# Patient Record
Sex: MALE | Race: WHITE | NOT HISPANIC OR LATINO
[De-identification: names, ages, dates, MRNs, and addresses within clinical notes are randomized per-mention and may not be internally consistent; named-entity substitution may affect disease eponyms.]

---

## 2020-01-09 ENCOUNTER — APPOINTMENT (OUTPATIENT)
Dept: VASCULAR SURGERY | Facility: CLINIC | Age: 65
End: 2020-01-09
Payer: COMMERCIAL

## 2020-01-09 VITALS — HEIGHT: 67 IN | BODY MASS INDEX: 36.88 KG/M2 | WEIGHT: 235 LBS

## 2020-01-09 DIAGNOSIS — Z78.9 OTHER SPECIFIED HEALTH STATUS: ICD-10-CM

## 2020-01-09 DIAGNOSIS — Z80.0 FAMILY HISTORY OF MALIGNANT NEOPLASM OF DIGESTIVE ORGANS: ICD-10-CM

## 2020-01-09 PROCEDURE — 99242 OFF/OP CONSLTJ NEW/EST SF 20: CPT

## 2020-01-09 NOTE — PHYSICAL EXAM
[Normal Breath Sounds] : Normal breath sounds [2+] : right 2+ [Varicose Veins Of The Left Leg] : of the left leg [Varicose Veins Of Lower Extremities] : present [No Rash or Lesion] : No rash or lesion [Ankle Swelling On The Right] : mild [Oriented to Person] : oriented to person [Alert] : alert [Oriented to Place] : oriented to place [Calm] : calm [de-identified] : NAD [de-identified] : supple [de-identified] : NCAT [FreeTextEntry1] : left posterior thigh / pop fossa with small focal varicose vein, soft and compressible; legs with scattered telangiectasias [de-identified] : soft, NT, ND

## 2020-01-09 NOTE — ASSESSMENT
[FreeTextEntry1] : 66 y/o M w/ left leg asymptomatic varicose veins\par Discussed with him the natural hx of venous disease, as well as symptoms that venous disease causes. He has no such symptoms currently. We discussed that he may benefit from compression therapy, but this is not a strict recommendation as he does not have edema. No further workup needed currently.\par He will f/u with me if he develops any symptoms.

## 2020-01-09 NOTE — CONSULT LETTER
[Dear  ___] : Dear  [unfilled], [Consult Letter:] : I had the pleasure of evaluating your patient, [unfilled]. [Please see my note below.] : Please see my note below. [( Thank you for referring [unfilled] for consultation for _____ )] : Thank you for referring [unfilled] for consultation for [unfilled] [Consult Closing:] : Thank you very much for allowing me to participate in the care of this patient.  If you have any questions, please do not hesitate to contact me. [Sincerely,] : Sincerely, [FreeTextEntry3] : Ethan Daly MD, RPVI

## 2020-01-09 NOTE — HISTORY OF PRESENT ILLNESS
[FreeTextEntry1] : 66 y/o M referred by Dr. Sanabria for evaluation of left leg varicose vein.\par He noticed a focal varicose vein on the back of his left thigh going to his knee several months ago, he does not know how long he has had it. He denies pain, discomfort, burning, itching or any other symptom associated with the vein. He denies leg swelling, heaviness, achiness.\par Ambulates well without difficulty.\par \par No past hx of DVT or PE.\par Former smoker, quit 40 yrs ago.\par \par No past medical hx\par s/p carpal tunnel release\par s/p left knee arthoscopic surgery

## 2020-07-31 ENCOUNTER — APPOINTMENT (OUTPATIENT)
Dept: CARDIOLOGY | Facility: CLINIC | Age: 65
End: 2020-07-31
Payer: COMMERCIAL

## 2020-07-31 VITALS
DIASTOLIC BLOOD PRESSURE: 80 MMHG | SYSTOLIC BLOOD PRESSURE: 124 MMHG | BODY MASS INDEX: 37.67 KG/M2 | WEIGHT: 240 LBS | HEIGHT: 67 IN | HEART RATE: 75 BPM

## 2020-07-31 DIAGNOSIS — Z82.49 FAMILY HISTORY OF ISCHEMIC HEART DISEASE AND OTHER DISEASES OF THE CIRCULATORY SYSTEM: ICD-10-CM

## 2020-07-31 PROCEDURE — 99204 OFFICE O/P NEW MOD 45 MIN: CPT | Mod: 25

## 2020-07-31 PROCEDURE — 93015 CV STRESS TEST SUPVJ I&R: CPT

## 2020-07-31 NOTE — PHYSICAL EXAM
[Normal Conjunctiva] : the conjunctiva exhibited no abnormalities [FreeTextEntry1] : obese male. [Normal Oral Mucosa] : normal oral mucosa [Eyelids - No Xanthelasma] : the eyelids demonstrated no xanthelasmas [No Oral Pallor] : no oral pallor [Normal Jugular Venous A Waves Present] : normal jugular venous A waves present [No Oral Cyanosis] : no oral cyanosis [Normal Jugular Venous V Waves Present] : normal jugular venous V waves present [No Jugular Venous Gannon A Waves] : no jugular venous gannon A waves [Respiration, Rhythm And Depth] : normal respiratory rhythm and effort [Exaggerated Use Of Accessory Muscles For Inspiration] : no accessory muscle use [Auscultation Breath Sounds / Voice Sounds] : lungs were clear to auscultation bilaterally [Heart Sounds] : normal S1 and S2 [Heart Rate And Rhythm] : heart rate and rhythm were normal [Abdomen Soft] : soft [Murmurs] : no murmurs present [Abdomen Tenderness] : non-tender [Abnormal Walk] : normal gait [Abdomen Mass (___ Cm)] : no abdominal mass palpated [Gait - Sufficient For Exercise Testing] : the gait was sufficient for exercise testing [Nail Clubbing] : no clubbing of the fingernails [Cyanosis, Localized] : no localized cyanosis [Petechial Hemorrhages (___cm)] : no petechial hemorrhages [Skin Color & Pigmentation] : normal skin color and pigmentation [Skin Lesions] : no skin lesions [] : no rash [No Venous Stasis] : no venous stasis [No Skin Ulcers] : no skin ulcer [No Xanthoma] : no  xanthoma was observed [Affect] : the affect was normal [Mood] : the mood was normal [Oriented To Time, Place, And Person] : oriented to person, place, and time [No Anxiety] : not feeling anxious

## 2020-07-31 NOTE — PHYSICAL EXAM
[FreeTextEntry1] : obese male. [Normal Conjunctiva] : the conjunctiva exhibited no abnormalities [Normal Oral Mucosa] : normal oral mucosa [Eyelids - No Xanthelasma] : the eyelids demonstrated no xanthelasmas [No Oral Pallor] : no oral pallor [Normal Jugular Venous A Waves Present] : normal jugular venous A waves present [No Oral Cyanosis] : no oral cyanosis [Normal Jugular Venous V Waves Present] : normal jugular venous V waves present [No Jugular Venous Gannon A Waves] : no jugular venous gannon A waves [Exaggerated Use Of Accessory Muscles For Inspiration] : no accessory muscle use [Respiration, Rhythm And Depth] : normal respiratory rhythm and effort [Heart Sounds] : normal S1 and S2 [Heart Rate And Rhythm] : heart rate and rhythm were normal [Auscultation Breath Sounds / Voice Sounds] : lungs were clear to auscultation bilaterally [Murmurs] : no murmurs present [Abdomen Soft] : soft [Abdomen Tenderness] : non-tender [Abnormal Walk] : normal gait [Abdomen Mass (___ Cm)] : no abdominal mass palpated [Cyanosis, Localized] : no localized cyanosis [Gait - Sufficient For Exercise Testing] : the gait was sufficient for exercise testing [Nail Clubbing] : no clubbing of the fingernails [Skin Color & Pigmentation] : normal skin color and pigmentation [Petechial Hemorrhages (___cm)] : no petechial hemorrhages [No Venous Stasis] : no venous stasis [] : no rash [Skin Lesions] : no skin lesions [No Skin Ulcers] : no skin ulcer [No Xanthoma] : no  xanthoma was observed [Affect] : the affect was normal [Mood] : the mood was normal [Oriented To Time, Place, And Person] : oriented to person, place, and time [No Anxiety] : not feeling anxious

## 2020-08-03 NOTE — REASON FOR VISIT
[Initial Evaluation] : an initial evaluation of [FreeTextEntry1] : The patient describes no acute cardiac symptoms. However he comes for cardiology evaluation and a preventive mode. The patient has had no symptoms of chest pain or unusual shortness of breath. He did experience dizziness recently when climbing up and down stairs while wearing a mask. No history of a heart condition or heart diagnosis. He wants consulted a vascular surgeon because of varicose veins.

## 2020-08-03 NOTE — HISTORY OF PRESENT ILLNESS
[FreeTextEntry1] : Patient has never spent the night in the hospital. Is no history of hypertension or diabetes mellitus. There is no history of asthma or wheezing. As you know is a nonsmoker;.his cholesterol has been "good"

## 2020-08-03 NOTE — DISCUSSION/SUMMARY
[FreeTextEntry1] : The patient comes for a cardiology evaluation. The patient's main clinical issue in my opinion is obesity. Other risk factors appear to be relatively well controlled. There is no history of diabetes hyperlipidemia or cigarette smoking. Today's treadmill stress test revealed no evidence of exercise-induced myocardial ischemia however exercise capacity is significantly limited. I urged the patient to maintain a program of regular walking exercise and weight reduction. We discussed the importance of a low call the high-grade  at some length. Followup will be in 6 months. I have asked the patient to contact me if there are any untoward symptoms.

## 2021-04-06 ENCOUNTER — APPOINTMENT (OUTPATIENT)
Dept: CARDIOLOGY | Facility: CLINIC | Age: 66
End: 2021-04-06
Payer: COMMERCIAL

## 2021-04-06 ENCOUNTER — NON-APPOINTMENT (OUTPATIENT)
Age: 66
End: 2021-04-06

## 2021-04-06 VITALS
SYSTOLIC BLOOD PRESSURE: 115 MMHG | WEIGHT: 246 LBS | DIASTOLIC BLOOD PRESSURE: 84 MMHG | HEIGHT: 67 IN | BODY MASS INDEX: 38.61 KG/M2 | HEART RATE: 69 BPM

## 2021-04-06 DIAGNOSIS — Z00.00 ENCOUNTER FOR GENERAL ADULT MEDICAL EXAMINATION W/OUT ABNORMAL FINDINGS: ICD-10-CM

## 2021-04-06 PROCEDURE — 93000 ELECTROCARDIOGRAM COMPLETE: CPT

## 2021-04-06 PROCEDURE — 99072 ADDL SUPL MATRL&STAF TM PHE: CPT

## 2021-04-06 PROCEDURE — 99213 OFFICE O/P EST LOW 20 MIN: CPT

## 2021-04-06 RX ORDER — MINOCYCLINE HYDROCHLORIDE 1 MG/1
1 POWDER ORAL
Qty: 33 | Refills: 0 | Status: DISCONTINUED | COMMUNITY
Start: 2021-03-15 | End: 2021-04-06

## 2021-04-06 RX ORDER — DOXYCYCLINE HYCLATE 50 MG/1
50 CAPSULE ORAL
Qty: 20 | Refills: 0 | Status: DISCONTINUED | COMMUNITY
Start: 2020-12-30 | End: 2021-04-06

## 2021-04-06 NOTE — REASON FOR VISIT
[FreeTextEntry1] : The patient returns for followup today. His main clinical issues and his extreme overweight. The patient has a long history of overweight. He was heavy as a child and this has continued into his adulthood. There is a family history of obesity. In spite of this the patient has enjoyed excellent health. Patient has had no cardiac symptoms. There were no symptoms of chest pain shortness of breath or palpitations. Exercise and activity are somewhat limited. He does have ongoing left knee pain and underwent meniscal surgery on that knee previously. The pt. has no history of hypertension diabetes or hyperlipidemia.

## 2021-04-06 NOTE — DISCUSSION/SUMMARY
[FreeTextEntry1] : The patient's only clinical finding of severe obesity his height is 5 foot 7 and his weight today was 246. Other than this problem the patient has enjoyed excellent health. I believe there is a grade opportunity here to improve his long-term health prospects body weight reduction this will involve modifying his diet which has been very high in carbohydrates and trying to improve his exercise. I suggested swimming as a good exercise for him. The patient's cardiogram today was normal as was her previous one done elsewhere. He had an echocardiogram done by his primary physician which showed is recorded as essentially normal. Except for some left atrial enlargement. Plan to do another echo in 6 months time.

## 2021-04-06 NOTE — PHYSICAL EXAM
[FreeTextEntry1] : severely overweight. [Normal Conjunctiva] : the conjunctiva exhibited no abnormalities [Eyelids - No Xanthelasma] : the eyelids demonstrated no xanthelasmas [Normal Oral Mucosa] : normal oral mucosa [No Oral Pallor] : no oral pallor [No Oral Cyanosis] : no oral cyanosis [Normal Jugular Venous A Waves Present] : normal jugular venous A waves present [Normal Jugular Venous V Waves Present] : normal jugular venous V waves present [No Jugular Venous Gannon A Waves] : no jugular venous gannon A waves [Respiration, Rhythm And Depth] : normal respiratory rhythm and effort [Exaggerated Use Of Accessory Muscles For Inspiration] : no accessory muscle use [Auscultation Breath Sounds / Voice Sounds] : lungs were clear to auscultation bilaterally [Heart Rate And Rhythm] : heart rate and rhythm were normal [Heart Sounds] : normal S1 and S2 [Murmurs] : no murmurs present [Abdomen Soft] : soft [Abdomen Tenderness] : non-tender [Abdomen Mass (___ Cm)] : no abdominal mass palpated [Abnormal Walk] : normal gait [Gait - Sufficient For Exercise Testing] : the gait was sufficient for exercise testing [Nail Clubbing] : no clubbing of the fingernails [Cyanosis, Localized] : no localized cyanosis [Petechial Hemorrhages (___cm)] : no petechial hemorrhages [Skin Color & Pigmentation] : normal skin color and pigmentation [] : no rash [No Venous Stasis] : no venous stasis [Skin Lesions] : no skin lesions [No Skin Ulcers] : no skin ulcer [No Xanthoma] : no  xanthoma was observed [Oriented To Time, Place, And Person] : oriented to person, place, and time [Affect] : the affect was normal [Mood] : the mood was normal [No Anxiety] : not feeling anxious

## 2021-06-03 ENCOUNTER — APPOINTMENT (OUTPATIENT)
Dept: VASCULAR SURGERY | Facility: CLINIC | Age: 66
End: 2021-06-03
Payer: COMMERCIAL

## 2021-06-03 VITALS
SYSTOLIC BLOOD PRESSURE: 143 MMHG | DIASTOLIC BLOOD PRESSURE: 94 MMHG | BODY MASS INDEX: 38.61 KG/M2 | HEART RATE: 68 BPM | RESPIRATION RATE: 16 BRPM | OXYGEN SATURATION: 98 % | HEIGHT: 67 IN | WEIGHT: 246 LBS

## 2021-06-03 PROCEDURE — 99213 OFFICE O/P EST LOW 20 MIN: CPT

## 2021-06-03 PROCEDURE — 99072 ADDL SUPL MATRL&STAF TM PHE: CPT

## 2021-06-03 PROCEDURE — 93971 EXTREMITY STUDY: CPT

## 2021-06-13 NOTE — PHYSICAL EXAM
[Normal Breath Sounds] : Normal breath sounds [2+] : left 2+ [Varicose Veins Of Lower Extremities] : present [Varicose Veins Of The Left Leg] : of the left leg [Ankle Swelling On The Right] : mild [No Rash or Lesion] : No rash or lesion [Alert] : alert [Oriented to Person] : oriented to person [Oriented to Place] : oriented to place [Calm] : calm [de-identified] : NAD [de-identified] : NCAT [de-identified] : supple [FreeTextEntry1] : left posterior thigh / pop fossa with small focal varicose vein, soft and compressible; legs with scattered telangiectasias [de-identified] : soft, NT, ND

## 2021-06-13 NOTE — HISTORY OF PRESENT ILLNESS
[FreeTextEntry1] : 66 y/o M referred by Dr. Sanabria for evaluation of left leg varicose vein.\par He noticed a focal varicose vein on the back of his left thigh going to his knee several months ago, he does not know how long he has had it. He denies pain, discomfort, burning, itching or any other symptom associated with the vein. He denies leg swelling, heaviness, achiness.\par Ambulates well without difficulty.\par \par No past hx of DVT or PE.\par Former smoker, quit 40 yrs ago.\par \par No past medical hx\par s/p carpal tunnel release\par s/p left knee arthoscopic surgery [de-identified] : 6/3/21 - He returns for follow-up as he recently noted enlargement of his left thigh varicose vein.  He continues to deny pain, discomfort, itching, heaviness or aching over the leg.

## 2021-09-13 ENCOUNTER — NON-APPOINTMENT (OUTPATIENT)
Age: 66
End: 2021-09-13

## 2021-09-13 ENCOUNTER — APPOINTMENT (OUTPATIENT)
Dept: CARDIOLOGY | Facility: CLINIC | Age: 66
End: 2021-09-13
Payer: COMMERCIAL

## 2021-09-13 VITALS
WEIGHT: 239 LBS | HEIGHT: 67 IN | HEART RATE: 58 BPM | BODY MASS INDEX: 37.51 KG/M2 | DIASTOLIC BLOOD PRESSURE: 80 MMHG | SYSTOLIC BLOOD PRESSURE: 140 MMHG | TEMPERATURE: 97.8 F

## 2021-09-13 PROCEDURE — 93000 ELECTROCARDIOGRAM COMPLETE: CPT

## 2021-09-13 PROCEDURE — 99213 OFFICE O/P EST LOW 20 MIN: CPT

## 2021-09-13 NOTE — DISCUSSION/SUMMARY
[FreeTextEntry1] : The patient's clinical condition is currently stable. He underwent angiography at [facility] when he presented there with chest discomfort and elevated troponin the study revealed nonobstructive coronary disease including a 30% narrowing of the mid circumflex the other vessels revealed only mild luminal irregularities the left main was normal the ejection fraction was 55%\par \par The patient was sent home on aspirin 81 mg and atorvastatin 20 mg.\par \par I'm electing to add losartan 25 mg daily as a cardioprotective and slightly hypotensive agent.\par the patient is advised to continue a program of moderate exercise and weight reduction complaining to do a repeat treadmill test in the next few weeks.

## 2021-09-13 NOTE — REASON FOR VISIT
[FreeTextEntry1] : Patient was treated at [facility] on September 7 with symptoms of chest discomfort across the upper part of his chest lasting about half an hour. Those symptoms have completely resolved and the patient has returned to work and now feels back to normal with no recurrence of chest discomfort.\par In spite of the finding of only nonobstructive disease on angiography, I believe this may have represented a coronary event and planning to provide the patient with a maximum preventive strategy.\par My findings and recommendations were discussed with the patient and his wife.\par today's electrocardiogram reveals normal sinus rhythm and no acute changes are noted.

## 2021-10-19 ENCOUNTER — APPOINTMENT (OUTPATIENT)
Dept: CARDIOLOGY | Facility: CLINIC | Age: 66
End: 2021-10-19
Payer: COMMERCIAL

## 2021-10-19 ENCOUNTER — APPOINTMENT (OUTPATIENT)
Dept: CARDIOLOGY | Facility: CLINIC | Age: 66
End: 2021-10-19

## 2021-10-19 VITALS
DIASTOLIC BLOOD PRESSURE: 80 MMHG | HEIGHT: 67 IN | WEIGHT: 245 LBS | TEMPERATURE: 97 F | SYSTOLIC BLOOD PRESSURE: 120 MMHG | BODY MASS INDEX: 38.45 KG/M2

## 2021-10-19 PROCEDURE — 99213 OFFICE O/P EST LOW 20 MIN: CPT | Mod: 25

## 2021-10-19 PROCEDURE — 93015 CV STRESS TEST SUPVJ I&R: CPT

## 2021-10-19 NOTE — REASON FOR VISIT
[FreeTextEntry1] : The patient returns to the office today for followup including treadmill testing. He developed symptoms of chest discomfort and evidence of non-Q MI on 9 721 the patient was taken to the Cath Lab at [facility]. Study revealed nonobstructive coronary disease with the most significant narrowing being  30% and the left circumflex. This was followed by "a small aneurysm". Patient did not require coronary stenting. Following this incident his clinical condition has stabilized. 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 Patient's main risk factor is severe obesity. The patient has had difficulty losing weight. Ventricular function was normal at cardiac cath.

## 2021-10-19 NOTE — HISTORY OF PRESENT ILLNESS
[FreeTextEntry1] : Patient describes no symptoms of chest discomfort significant shortness of breath dizziness lightheadedness or palpitations.

## 2021-10-19 NOTE — DISCUSSION/SUMMARY
[FreeTextEntry1] : The patient's treadmill test is characterized by limited exercise capacity but no evidence of exercise-induced myocardial ischemia. Following the test I discussed the findings in detail with the patient and his wife and I also reviewed the recent cardiac angiogram. We must focused our attention on aggressive risk factor modification. I recommended that atorvastatin be increased from 20 mg to 40 mg daily. He will also remain on low dose aspirin and losartan. I also discussed in detail the importance of diet and exercise. We spoke about a low carbohydrate diet and a program of regular aerobic exercise which has been lacking. Weight reduction is necessary in this case. We will continue to follow the patient's progress periodically and I have asked the patient to call if there are any recurrent symptoms.

## 2022-03-24 ENCOUNTER — LABORATORY RESULT (OUTPATIENT)
Age: 67
End: 2022-03-24

## 2022-05-03 ENCOUNTER — APPOINTMENT (OUTPATIENT)
Dept: CARDIOLOGY | Facility: CLINIC | Age: 67
End: 2022-05-03

## 2022-05-03 ENCOUNTER — APPOINTMENT (OUTPATIENT)
Dept: CARDIOLOGY | Facility: CLINIC | Age: 67
End: 2022-05-03
Payer: COMMERCIAL

## 2022-05-03 ENCOUNTER — NON-APPOINTMENT (OUTPATIENT)
Age: 67
End: 2022-05-03

## 2022-05-03 VITALS
BODY MASS INDEX: 37.98 KG/M2 | DIASTOLIC BLOOD PRESSURE: 80 MMHG | WEIGHT: 242 LBS | OXYGEN SATURATION: 95 % | SYSTOLIC BLOOD PRESSURE: 130 MMHG | HEIGHT: 67 IN | HEART RATE: 81 BPM

## 2022-05-03 PROCEDURE — 93000 ELECTROCARDIOGRAM COMPLETE: CPT

## 2022-05-03 PROCEDURE — 99214 OFFICE O/P EST MOD 30 MIN: CPT

## 2022-05-03 NOTE — DISCUSSION/SUMMARY
[FreeTextEntry1] : The patient's clinical condition is stable.  There have been no recurrences of cardiac symptoms.  There have been no symptoms of chest pain shortness of breath or palpitations.  The patient does walking exercise at work but unfortunately weight reduction has been very challenging in this case.  The cardiorespiratory examination is normal the electrocardiograph reveals sinus rhythm with no acute changes noted.  The patient is on appropriate medications with aspirin 81 mg atorvastatin 40 mg losartan 25 mg twice daily.  He will be having blood work with his primary care physician shortly.  In order to facilitate weight reduction I would wonder if he might be a good candidate for semaglutide.  Urging the patient to try to increase his program of exercise to include some form of aerobic exercise on a daily basis.  I do like to do treadmill testing at the next visit in several months.\par The patient's recent lipid profile revealed extraordinarily low numbers.  I am electing to continue his statin as is because is tolerating it well without clinical symptoms or side effects.

## 2022-05-03 NOTE — REASON FOR VISIT
[FreeTextEntry1] : The patient comes for follow-up today.  He had a coronary syndrome in September 2021 and was taken to the Cath Lab at [facility] he was taken to the Cath Lab and this study revealed nonobstructive coronary artery disease with the most significant narrowing being in the left circumflex of 30%.  He has been treated with medications and there has never been a recurrence of those symptoms.  Symptom at that time was like a heaviness across his upper chest.  The patient is active at work as a  he does considerable walking and has had no difficulty in doing this.  Other exercise besides his work is limited.

## 2022-10-17 ENCOUNTER — NON-APPOINTMENT (OUTPATIENT)
Age: 67
End: 2022-10-17

## 2022-10-17 ENCOUNTER — APPOINTMENT (OUTPATIENT)
Dept: CARDIOLOGY | Facility: CLINIC | Age: 67
End: 2022-10-17

## 2022-10-17 VITALS
SYSTOLIC BLOOD PRESSURE: 118 MMHG | HEIGHT: 67 IN | DIASTOLIC BLOOD PRESSURE: 82 MMHG | TEMPERATURE: 97.6 F | RESPIRATION RATE: 18 BRPM | WEIGHT: 230.13 LBS | BODY MASS INDEX: 36.12 KG/M2 | HEART RATE: 66 BPM | OXYGEN SATURATION: 97 %

## 2022-10-17 PROCEDURE — 93000 ELECTROCARDIOGRAM COMPLETE: CPT

## 2022-10-17 PROCEDURE — 99214 OFFICE O/P EST MOD 30 MIN: CPT | Mod: 25

## 2022-10-17 NOTE — DISCUSSION/SUMMARY
[FreeTextEntry1] : The patient had a very effective response to Ozempic (semaglutide) which was given for weight reduction by his primary care physician.  Reason why this should not be continued until an optimal weight is achieved.  There have been no acute cardiac symptoms cardiorespiratory examination is normal blood pressure was 118/82 electrocardiogram normal.\par Benefits of the GLP-1 agonist class were discussed at great length with the patient and his wife.\par \par The patient's exercise has been somewhat limited and I am urging him to try to begin a program of daily exercise. \par \par Plan to do treadmill testing at the next visit.

## 2022-10-18 ENCOUNTER — NON-APPOINTMENT (OUTPATIENT)
Age: 67
End: 2022-10-18

## 2022-11-29 ENCOUNTER — APPOINTMENT (OUTPATIENT)
Dept: CARDIOLOGY | Facility: CLINIC | Age: 67
End: 2022-11-29

## 2023-01-27 ENCOUNTER — RX RENEWAL (OUTPATIENT)
Age: 68
End: 2023-01-27

## 2023-04-18 ENCOUNTER — APPOINTMENT (OUTPATIENT)
Dept: CARDIOLOGY | Facility: CLINIC | Age: 68
End: 2023-04-18
Payer: COMMERCIAL

## 2023-04-18 VITALS
DIASTOLIC BLOOD PRESSURE: 78 MMHG | BODY MASS INDEX: 36.76 KG/M2 | HEART RATE: 56 BPM | RESPIRATION RATE: 18 BRPM | WEIGHT: 234.19 LBS | TEMPERATURE: 97.2 F | HEIGHT: 67 IN | SYSTOLIC BLOOD PRESSURE: 134 MMHG | OXYGEN SATURATION: 97 %

## 2023-04-18 PROCEDURE — 99213 OFFICE O/P EST LOW 20 MIN: CPT | Mod: 25

## 2023-04-18 PROCEDURE — 93015 CV STRESS TEST SUPVJ I&R: CPT

## 2023-04-18 NOTE — DISCUSSION/SUMMARY
[FreeTextEntry1] : The patient returns today for follow-up including treadmill testing\par \par Fortunately there has been no recurrence of chest pain or other cardiac symptoms.  However the patient has had difficulty losing additional weight and has been sedentary.\par \par He had initially lost weight with Ozempic but this leveled off and Mounjaro was not additionally effective as well.\par \par The treadmill test today was characterized by limited exercise capacity but no evidence of exercise-induced myocardial ischemia\par \par The patient needs to get into a program of more consistent exercise and also a nutrition consult would be valuable and important.\par \par

## 2023-04-18 NOTE — REASON FOR VISIT
[FreeTextEntry1] : Patient returns today for follow-up and treadmill testing.  He was referred to cardiac catheterization in September 2021 at [facility] and was found to have nonobstructive coronary artery disease.  The patient has done well with medical treatment since that time and has been making a concerted effort to lose weight\par \par Patient denies any acute cardiac symptoms.  \par However we have not made significant progress with exercise or weight reduction.  \par \par

## 2023-04-18 NOTE — PHYSICAL EXAM

## 2024-02-12 RX ORDER — LOSARTAN POTASSIUM 25 MG/1
25 TABLET, FILM COATED ORAL
Qty: 180 | Refills: 3 | Status: ACTIVE | COMMUNITY
Start: 2021-09-13 | End: 1900-01-01

## 2024-02-12 RX ORDER — ASPIRIN ENTERIC COATED TABLETS 81 MG 81 MG/1
81 TABLET, DELAYED RELEASE ORAL DAILY
Qty: 90 | Refills: 3 | Status: ACTIVE | COMMUNITY
Start: 2021-04-06 | End: 1900-01-01

## 2024-02-13 ENCOUNTER — RX RENEWAL (OUTPATIENT)
Age: 69
End: 2024-02-13

## 2024-02-13 RX ORDER — ATORVASTATIN CALCIUM 40 MG/1
40 TABLET, FILM COATED ORAL
Qty: 90 | Refills: 3 | Status: ACTIVE | COMMUNITY
Start: 2021-09-08 | End: 1900-01-01

## 2024-04-23 ENCOUNTER — NON-APPOINTMENT (OUTPATIENT)
Age: 69
End: 2024-04-23

## 2024-04-23 ENCOUNTER — APPOINTMENT (OUTPATIENT)
Dept: CARDIOLOGY | Facility: CLINIC | Age: 69
End: 2024-04-23
Payer: COMMERCIAL

## 2024-04-23 ENCOUNTER — APPOINTMENT (OUTPATIENT)
Dept: HEART AND VASCULAR | Facility: CLINIC | Age: 69
End: 2024-04-23
Payer: COMMERCIAL

## 2024-04-23 VITALS
HEIGHT: 67 IN | DIASTOLIC BLOOD PRESSURE: 80 MMHG | SYSTOLIC BLOOD PRESSURE: 120 MMHG | HEART RATE: 75 BPM | BODY MASS INDEX: 38.45 KG/M2 | WEIGHT: 245 LBS | OXYGEN SATURATION: 98 %

## 2024-04-23 DIAGNOSIS — I10 ESSENTIAL (PRIMARY) HYPERTENSION: ICD-10-CM

## 2024-04-23 DIAGNOSIS — I25.10 ATHEROSCLEROTIC HEART DISEASE OF NATIVE CORONARY ARTERY W/OUT ANGINA PECTORIS: ICD-10-CM

## 2024-04-23 DIAGNOSIS — I83.90 ASYMPTOMATIC VARICOSE VEINS OF UNSPECIFIED LOWER EXTREMITY: ICD-10-CM

## 2024-04-23 DIAGNOSIS — E66.9 OBESITY, UNSPECIFIED: ICD-10-CM

## 2024-04-23 DIAGNOSIS — E66.01 MORBID (SEVERE) OBESITY DUE TO EXCESS CALORIES: ICD-10-CM

## 2024-04-23 PROCEDURE — 93000 ELECTROCARDIOGRAM COMPLETE: CPT

## 2024-04-23 PROCEDURE — 99214 OFFICE O/P EST MOD 30 MIN: CPT | Mod: 25

## 2024-04-23 NOTE — CARDIOLOGY SUMMARY
[de-identified] : Exercise Stress Test 4/18/23: 5:46 min:s, Max, Mets: 6.20. 72%, test stopped due to fatigue. Appropriate hr and bp response. No ST changes. No evidence of exercise induced ischemia to attained HR.

## 2024-04-23 NOTE — ASSESSMENT
[FreeTextEntry1] : 69M   Non Obstructive CAD HTN HLD  Obesity  Reviewed prior testing.   EKG NSR Treadmill Stress Aprol 2023 -no ischemia though suboptimal test, 72% MPHR, 6 METS Cath 2021: non obs cad, 30% LCx  - update ECHO - referral for sleep study - continue baby aspirin, lipitor 40mg - BP controlled - continue losartan 25mg - counseled on weight loss

## 2024-04-23 NOTE — HISTORY OF PRESENT ILLNESS
[FreeTextEntry1] : 69M patient of Dr. Craft here today to transition care.  He was referred to cardiac catheterization in September 2021 at [facility] and was found to have nonobstructive coronary artery disease. The patient has done well with medical treatment since that time and has been making a concerted effort to lose weight. He had initially lost weight with Ozempic but this leveled off and Mounjaro was not additionally effective as well.   No cp/sob/palpitations/edema/dizziness.   Does not monitor BP at home. 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     Non smoker Social etoh use Fhx: Father HF/CAD early 60s

## 2024-07-05 ENCOUNTER — APPOINTMENT (OUTPATIENT)
Dept: CARDIOLOGY | Facility: CLINIC | Age: 69
End: 2024-07-05
Payer: COMMERCIAL

## 2024-07-05 PROCEDURE — 93306 TTE W/DOPPLER COMPLETE: CPT

## 2024-09-25 ENCOUNTER — APPOINTMENT (OUTPATIENT)
Dept: CARDIOLOGY | Facility: CLINIC | Age: 69
End: 2024-09-25
Payer: COMMERCIAL

## 2024-09-25 PROCEDURE — 36415 COLL VENOUS BLD VENIPUNCTURE: CPT

## 2024-09-27 LAB
ALBUMIN SERPL ELPH-MCNC: 4.4 G/DL
ALP BLD-CCNC: 63 U/L
ALT SERPL-CCNC: 42 U/L
ANION GAP SERPL CALC-SCNC: 18 MMOL/L
AST SERPL-CCNC: 20 U/L
BILIRUB SERPL-MCNC: 0.4 MG/DL
BUN SERPL-MCNC: 23 MG/DL
CALCIUM SERPL-MCNC: 10.1 MG/DL
CHLORIDE SERPL-SCNC: 102 MMOL/L
CHOLEST SERPL-MCNC: 98 MG/DL
CO2 SERPL-SCNC: 17 MMOL/L
CREAT SERPL-MCNC: 0.99 MG/DL
EGFR: 83 ML/MIN/1.73M2
GLUCOSE SERPL-MCNC: 100 MG/DL
HDLC SERPL-MCNC: 37 MG/DL
LDLC SERPL CALC-MCNC: 42 MG/DL
NONHDLC SERPL-MCNC: 61 MG/DL
POTASSIUM SERPL-SCNC: 5.4 MMOL/L
PROT SERPL-MCNC: 7.1 G/DL
SODIUM SERPL-SCNC: 136 MMOL/L
TRIGL SERPL-MCNC: 98 MG/DL

## 2025-03-25 ENCOUNTER — APPOINTMENT (OUTPATIENT)
Dept: HEART AND VASCULAR | Facility: CLINIC | Age: 70
End: 2025-03-25
Payer: COMMERCIAL

## 2025-03-25 ENCOUNTER — NON-APPOINTMENT (OUTPATIENT)
Age: 70
End: 2025-03-25

## 2025-03-25 VITALS
WEIGHT: 233 LBS | HEART RATE: 68 BPM | OXYGEN SATURATION: 97 % | DIASTOLIC BLOOD PRESSURE: 70 MMHG | SYSTOLIC BLOOD PRESSURE: 140 MMHG | BODY MASS INDEX: 36.49 KG/M2

## 2025-03-25 DIAGNOSIS — E66.813 OBESITY, CLASS 3: ICD-10-CM

## 2025-03-25 DIAGNOSIS — E66.9 OBESITY, UNSPECIFIED: ICD-10-CM

## 2025-03-25 DIAGNOSIS — E66.01 OBESITY, CLASS 3: ICD-10-CM

## 2025-03-25 DIAGNOSIS — I10 ESSENTIAL (PRIMARY) HYPERTENSION: ICD-10-CM

## 2025-03-25 DIAGNOSIS — I25.10 ATHEROSCLEROTIC HEART DISEASE OF NATIVE CORONARY ARTERY W/OUT ANGINA PECTORIS: ICD-10-CM

## 2025-03-25 DIAGNOSIS — I83.90 ASYMPTOMATIC VARICOSE VEINS OF UNSPECIFIED LOWER EXTREMITY: ICD-10-CM

## 2025-03-25 PROCEDURE — 93000 ELECTROCARDIOGRAM COMPLETE: CPT

## 2025-03-25 PROCEDURE — G2211 COMPLEX E/M VISIT ADD ON: CPT | Mod: NC

## 2025-03-25 PROCEDURE — 99214 OFFICE O/P EST MOD 30 MIN: CPT

## 2025-05-22 ENCOUNTER — RX RENEWAL (OUTPATIENT)
Age: 70
End: 2025-05-22